# Patient Record
Sex: MALE | Race: BLACK OR AFRICAN AMERICAN | NOT HISPANIC OR LATINO | Employment: UNEMPLOYED | URBAN - METROPOLITAN AREA
[De-identification: names, ages, dates, MRNs, and addresses within clinical notes are randomized per-mention and may not be internally consistent; named-entity substitution may affect disease eponyms.]

---

## 2024-10-18 ENCOUNTER — HOSPITAL ENCOUNTER (EMERGENCY)
Facility: HOSPITAL | Age: 24
Discharge: HOME | End: 2024-10-18
Attending: EMERGENCY MEDICINE

## 2024-10-18 VITALS
HEIGHT: 71 IN | OXYGEN SATURATION: 98 % | HEART RATE: 64 BPM | DIASTOLIC BLOOD PRESSURE: 80 MMHG | BODY MASS INDEX: 23.38 KG/M2 | WEIGHT: 167 LBS | SYSTOLIC BLOOD PRESSURE: 122 MMHG | RESPIRATION RATE: 16 BRPM | TEMPERATURE: 98 F

## 2024-10-18 DIAGNOSIS — H20.9 UVEITIS OF RIGHT EYE: Primary | ICD-10-CM

## 2024-10-18 DIAGNOSIS — H57.11 EYE PAIN, RIGHT: ICD-10-CM

## 2024-10-18 LAB — TREPONEMA PALLIDUM IGG+IGM AB [PRESENCE] IN SERUM OR PLASMA BY IMMUNOASSAY: NONREACTIVE

## 2024-10-18 PROCEDURE — 86480 TB TEST CELL IMMUN MEASURE: CPT | Performed by: PHYSICIAN ASSISTANT

## 2024-10-18 PROCEDURE — 25000003 PHARM REV CODE 250: Performed by: PHYSICIAN ASSISTANT

## 2024-10-18 PROCEDURE — 99284 EMERGENCY DEPT VISIT MOD MDM: CPT | Mod: 25

## 2024-10-18 PROCEDURE — 86593 SYPHILIS TEST NON-TREP QUANT: CPT | Performed by: PHYSICIAN ASSISTANT

## 2024-10-18 PROCEDURE — 82164 ANGIOTENSIN I ENZYME TEST: CPT | Performed by: PHYSICIAN ASSISTANT

## 2024-10-18 PROCEDURE — 85549 MURAMIDASE: CPT | Performed by: PHYSICIAN ASSISTANT

## 2024-10-18 RX ORDER — CYCLOPENTOLATE HYDROCHLORIDE 10 MG/ML
2 SOLUTION/ DROPS OPHTHALMIC 2 TIMES DAILY
Qty: 15 ML | Refills: 0 | Status: SHIPPED | OUTPATIENT
Start: 2024-10-18

## 2024-10-18 RX ORDER — PROPARACAINE HYDROCHLORIDE 5 MG/ML
2 SOLUTION/ DROPS OPHTHALMIC
Status: COMPLETED | OUTPATIENT
Start: 2024-10-18 | End: 2024-10-18

## 2024-10-18 RX ORDER — PREDNISOLONE ACETATE 10 MG/ML
1 SUSPENSION/ DROPS OPHTHALMIC
Qty: 15 ML | Refills: 0 | Status: SHIPPED | OUTPATIENT
Start: 2024-10-18 | End: 2024-10-28

## 2024-10-18 RX ADMIN — FLUORESCEIN SODIUM 1 EACH: 1 STRIP OPHTHALMIC at 12:10

## 2024-10-18 RX ADMIN — PROPARACAINE HYDROCHLORIDE 2 DROP: 5 SOLUTION/ DROPS OPHTHALMIC at 12:10

## 2024-10-18 NOTE — DISCHARGE INSTRUCTIONS
- Start Prednisolone acetate 1% every two hours around the clock into the Right Eye  - Start Cyclopentolate 2 times per day into the Right Eye     Follow up with Ophthalmology or return to the emergency department sooner for any new or worsening symptoms.

## 2024-10-18 NOTE — CONSULTS
"Consultation Report  Ophthalmology Service    Date: 10/18/2024    Chief complaint/Reason for Consult: "right eye pain, decreased vision"     History of Present Illness: Ricki De La O is a 24 y.o. male who presents with 2 day history of right eye pain, redness, and blurred vision. He notes first started yesterday with some discomfort and redness, pain 3/10. Woke up this morning with severe pain, 10/10, redness, photophobia. Denies trauma, foreign body, eye injury. Says roughly 1 year ago he had some blurry vision without much pain and was treated with topical steroids for 2 weeks and the blurriness improved. He denies any personal or family history of inflammatory conditions, denies other ocular issues. Denies trauma, denies previous surgery, denies eye injections.    POcularHx: Says roughly 1 year ago he had some blurry vision without much pain in the right eye and was treated with topical steroids for 2 weeks and the blurriness improved. No past ocular surgeries.  Does use glasses but not contacts.    Current eye gtts: none      PMHx:  has no past medical history on file.     PSurgHx:  has no past surgical history on file.     Home Medications:   Prior to Admission medications    Medication Sig Start Date End Date Taking? Authorizing Provider   guaifenesin-codeine 100-10 mg/5 ml (TUSSI-ORGANIDIN NR)  mg/5 mL syrup Take 10 mLs by mouth every 6 (six) hours as needed for Cough. 12/17/12  Yes Jorge Banks MD        Medications this encounter:     Allergies: has No Known Allergies.     Social:  reports that he has never smoked. He does not have any smokeless tobacco history on file. He reports that he does not drink alcohol.     Family Hx: No family history of glaucoma, macular degeneration, or blindness. family history is not on file.     ROS: Negative x 10 except for complaints as described in HPI; negative for fever, chills, weight loss, nausea, vomiting, diarrhea, shortness of breath, nasal discharge, " cough, abdominal pain, dyspnea, difficulty moving arms and legs, confusion, dysuria, palpitations, or chest pain     Ocular examination/Dilated fundus examination:  Base Eye Exam       Visual Acuity (Snellen - Linear)         Right Left    Dist sc 20/100 20/20    Dist ph sc 20/40 +1               Tonometry (Tonopen, 3:00 PM)         Right Left    Pressure 13 16              Tonometry #2 (Tonopen, 3:22 PM)         Right Left    Pressure 15               Pupils         Pupils Dark Light Shape React APD    Right PERRL 5 3 Round Brisk None    Left PERRL 5 3 Round Brisk None              Visual Fields (Counting fingers)         Right Left     Full Full              Extraocular Movement         Right Left     Full, Ortho Full, Ortho              Neuro/Psych       Mood/Affect: Normal              Dilation       Both eyes: 1% Mydriacyl, 2.5% Phenylephrine @ 3:20 PM                  Slit Lamp and Fundus Exam       External Exam         Right Left    External Normal Normal              Slit Lamp Exam         Right Left    Lids/Lashes Normal Normal    Conjunctiva/Sclera 2+ Injection White and quiet    Cornea 1+ Punctate epithelial erosions, diffuse mutton fat Keratic precipitates, Edema, Descemet's folds Clear    Anterior Chamber Deep and formed, 4+ Cell, 3-4+ Flare Deep and quiet    Iris Irregular pupil, Posterior synechiae, reactive Round and reactive    Lens Clear Clear    Anterior Vitreous Normal Normal              Fundus Exam         Right Left    Disc Normal, hazy view Normal large disc, sharp pink    C/D Ratio 0.5 0.55    Macula poor view flat and attached    Vessels poor view Normal    Periphery poor view flat attached, no h/t/d    View right eye limited due to poor dilation 2/2 synechiae, corneal edema, anterior inflammation, B scan without vitreitis/detachment, Poor view in right eye                  Bscan 10/18/2024  OD: No vitreitis, masses, or detachments in all areas observed    Assessment/Plan:   1. Acute  Uveitis Right Eye  - Patient with 2 day history of right eye pain, blurry vision, redness, photophobia with consensual photophobia, watery tearing  - Patient says he had an eye condition in the past an Optometrist gave him 2 weeks of topical steroids for the right eye for roughly 1 year ago with no further workup.  - Patient says right eye VA at baseline closer to 20/30 with glasses  - 4+ cell, 3-4+ flare OD, with diffuse mutton fat KPs,  +D folds, +corneal edema, noted posterior synechiae, no clear iris nodules noted  - OS deep and quiet, no Kps  - Poor view due to inflammation and corneal edema OD Bscan, wnl no vitreitis, masses detachments   - Will start drops as below and see in clinic early next week     Recommendations  - Start Prednisolone acetate 1% q2hrs around the clock into the Right Eye  - Start Cyclopentolate 2 times per day into the Right Eye   - Sarcoidosis evaluation with CXR, ACE/lysozyme  - TB workup with Quant Gold  - Syphilis labs, RPR/VDRL  - HLAB27 lab work up    Patient's best contact number #[phone]    If there are further questions, please page the on call ophthalmology resident.    Richard Fernandes MD  PGY2, Ophthalmology Resident  10/18/2024  3:24 PM

## 2024-10-18 NOTE — ED PROVIDER NOTES
Encounter Date: 10/18/2024       History     Chief Complaint   Patient presents with    Eye Drainage     Pt c/o R eye drainage, redness, and pain starting yesterday, denies fever/chills; vision still intact but slightly blurry     24-year-old male presents to the emergency department with chief complaint of redness, tearing, pain, slightly blurred vision to the right eye that began 2 days ago.  States that he wears glasses occasionally.  He does not wear contacts.  Denies any trauma or injury to the eye. Endorses light sensitivity.  He has not used any medication.  Denies symptoms of this nature in the past.  Denies other worsening or alleviating factors.      Review of patient's allergies indicates:  No Known Allergies  History reviewed. No pertinent past medical history.  History reviewed. No pertinent surgical history.  No family history on file.  Social History     Tobacco Use    Smoking status: Never   Substance Use Topics    Alcohol use: No     Review of Systems   Eyes:  Positive for photophobia, pain, redness and visual disturbance.       Physical Exam     Initial Vitals [10/18/24 1219]   BP Pulse Resp Temp SpO2   131/85 (!) 57 18 97.9 °F (36.6 °C) 99 %      MAP       --         Physical Exam    Vitals reviewed.  Constitutional: He appears well-developed and well-nourished. He is not diaphoretic. No distress.   HENT:   Head: Normocephalic and atraumatic. Mouth/Throat: Oropharynx is clear and moist.   Eyes: EOM are normal. Right conjunctiva is injected.   R pupil reactive to light, but slightly sluggish compared to the left. IOP 33-39; patient did not tolerate well    Neck: Neck supple.   Normal range of motion.  Cardiovascular:  Normal rate.           Pulmonary/Chest: No respiratory distress.   Abdominal: Abdomen is soft. Bowel sounds are normal. There is no abdominal tenderness.   Musculoskeletal:         General: Normal range of motion.      Cervical back: Normal range of motion and neck supple.      Neurological: He is alert and oriented to person, place, and time. He has normal strength. GCS score is 15. GCS eye subscore is 4. GCS verbal subscore is 5. GCS motor subscore is 6.   Skin: Skin is warm and dry. Capillary refill takes less than 2 seconds.   Psychiatric: He has a normal mood and affect. His behavior is normal. Judgment and thought content normal.         ED Course   Procedures  Labs Reviewed   ANGIOTENSIN CONVERTING ENZYME   LYSOZYME (MURAMIDASE), SERUM   QUANTIFERON GOLD TB   TREPONEMA PALLIDIUM ANTIBODIES IGG, IGM   HLA B27 ANTIGEN          Imaging Results              X-Ray Chest PA And Lateral (Final result)  Result time 10/18/24 17:46:58      Final result by Sanju Castro MD (10/18/24 17:46:58)                   Impression:      Normal radiographic appearance of the chest.      Electronically signed by: Sanju Castro MD  Date:    10/18/2024  Time:    17:46               Narrative:    EXAMINATION:  XR CHEST PA AND LATERAL    CLINICAL HISTORY:  Ocular pain, right eye    TECHNIQUE:  PA and lateral views of the chest were performed.    COMPARISON:  None    FINDINGS:  Trachea is midline and patent.The lungs are clear, with normal appearance of pulmonary vasculature and no pleural effusion or pneumothorax.    The cardiac silhouette is normal in size. The hilar and mediastinal contours are unremarkable.    Bones are intact. Imaged upper abdomen is within normal limits.                                       Medications   fluorescein ophthalmic strip 1 each (1 each Left Eye Given by Provider 10/18/24 4720)   proparacaine 0.5 % ophthalmic solution 2 drop (2 drops Left Eye Given by Provider 10/18/24 8057)     Medical Decision Making  Emergent evaluation of a 24 y.o. male presenting to the emergency department complaining of right eye pain, tearing, redness that began two days ago. Vision slightly blurred. Patient is afebrile, hemodynamically stable, and non toxic appearing.   Will perform wood's lamp  exam, check visual acuity/ eye pressures.     Differential diagnosis includes but isn't limited to conjunctivitis, corneal abrasion, corneal ulcer, iritis, uveitis, glaucoma.    No fluorescein uptake on wood's lamp examination.   Right pupil is reactive but sluggish   Elevated IOP but patient was straining the eyes and didn't tolerate well   Visual acuity decreased in right eye (20/40) compared to left (20/15)   Will consult ophthalmology     Ophthalmology has evaluated the patient.  Exam consistent with uveitis.  Recommendations are in consult note.  Ophthalmology also recommends obtaining chest x-ray and lab work prior to patient's discharge.  They will follow up the results in clinic.  Return precautions given.  Questions answered.  The patient was instructed to follow up with ophthalmology or to return to the emergency department for worsening symptoms. The treatment plan was discussed with the patient who demonstrated understanding and comfort with plan.     Amount and/or Complexity of Data Reviewed  Labs: ordered.  Radiology: ordered.    Risk  Prescription drug management.                                      Clinical Impression:  Final diagnoses:  [H57.11] Eye pain, right  [H20.9] Uveitis of right eye (Primary)          ED Disposition Condition    Discharge Stable          ED Prescriptions       Medication Sig Dispense Start Date End Date Auth. Provider    prednisoLONE acetate (PRED FORTE) 1 % DrpS Place 1 drop into the right eye every 2 (two) hours. for 10 days 15 mL 10/18/2024 10/28/2024 Lia Harris PA-C    cyclopentolate 1% (CYCLOGYL) 1 % ophthalmic solution Place 2 drops into the right eye 2 (two) times daily. 15 mL 10/18/2024 -- Lia Harris PA-C          Follow-up Information       Follow up With Specialties Details Why Contact Info Additional Information    [facility] - Emergency Dept Emergency Medicine Go to  If symptoms worsen [id number]  [facility]  [id number] [phone]     [facility] - [address] [place] [address] Ophthalmology Schedule an appointment as soon as possible for a visit   [address]  [facility] [address] [phone] Please arrive on the 10th floor for check-in.             Lia Harris PA-C  10/18/24 9933

## 2024-10-19 LAB
GAMMA INTERFERON BACKGROUND BLD IA-ACNC: 0.03 IU/ML
M TB IFN-G CD4+ BCKGRND COR BLD-ACNC: 0 IU/ML
M TB IFN-G CD4+ BCKGRND COR BLD-ACNC: 0.03 IU/ML
MITOGEN IGNF BCKGRD COR BLD-ACNC: 9.97 IU/ML
TB GOLD PLUS: NEGATIVE

## 2024-10-21 ENCOUNTER — TELEPHONE (OUTPATIENT)
Dept: OPHTHALMOLOGY | Facility: CLINIC | Age: 24
End: 2024-10-21

## 2024-10-21 NOTE — TELEPHONE ENCOUNTER
----- Message from Richard Fernandes sent at 10/18/2024  5:08 PM CDT -----  Regarding: ED follow up  Contact: [phone]  Hello,     This patient was seen in the Emergency Department for acute anterior uveitis. Can they please get follow up in general clinic on Monday 10/20/24?    Best contact number - [phone]    Thank you!  Richard Fernandes MD MSc  [employer] Ophthalmology PGY2
